# Patient Record
Sex: MALE
[De-identification: names, ages, dates, MRNs, and addresses within clinical notes are randomized per-mention and may not be internally consistent; named-entity substitution may affect disease eponyms.]

---

## 2018-09-21 ENCOUNTER — HOSPITAL ENCOUNTER (EMERGENCY)
Dept: HOSPITAL 39 - ER | Age: 33
Discharge: HOME | End: 2018-09-21
Payer: COMMERCIAL

## 2018-09-21 VITALS — DIASTOLIC BLOOD PRESSURE: 105 MMHG | SYSTOLIC BLOOD PRESSURE: 153 MMHG

## 2018-09-21 VITALS — OXYGEN SATURATION: 99 % | TEMPERATURE: 98.4 F

## 2018-09-21 DIAGNOSIS — S60.022A: ICD-10-CM
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
## 2018-09-21 DIAGNOSIS — S67.191A: Primary | ICD-10-CM
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
## 2018-09-21 PROCEDURE — 73140 X-RAY EXAM OF FINGER(S): CPT

## 2018-09-21 PROCEDURE — 80307 DRUG TEST PRSMV CHEM ANLYZR: CPT

## 2018-09-21 NOTE — ED.PDOC
History of Present Illness





- General


Chief Complaint: Upper Extremity Injury


Stated Complaint: smashed left index finger


Time Seen by Provider: 09/21/18 10:24


Source: patient


Exam Limitations: no limitations


Additional Information: 





33 YEAR OLD HERE AFTER A CRUSH INJURY TO THE LEFT INDEX FINGER AT WORK WHEN THE 

FINGER WAS CRUSHED UNDER A HYDRAULIC MACHINE








- History of Present Illness


Timing/Duration: 1/2 hour


Severity: moderate


Improving Factors: nothing


Associated Symptoms: syncope


Allergies/Adverse Reactions: 


Allergies





NO KNOWN ALLERGY Allergy (Unverified 06/02/15 14:54)


 








Home Medications: 


Ambulatory Orders





Naproxen [Naprosyn] 500 mg PO Q12HR #30 tab 09/21/18 











Review of Systems





- Review of Systems


Constitutional: States: no symptoms reported


EENTM: States: no symptoms reported


Respiratory: States: no symptoms reported


Cardiology: States: no symptoms reported


Gastrointestinal/Abdominal: States: no symptoms reported


Genitourinary: States: no symptoms reported


Musculoskeletal: States: no symptoms reported, see HPI


Skin: States: no symptoms reported


Neurological: States: no symptoms reported


Endocrine: States: no symptoms reported


Hematologic/Lymphatic: States: no symptoms reported


All other Systems: Reviewed and Negative





Past Medical History (General)





- Patient Medical History


Hx Seizures: No


Hx Stroke: No


Hx Dementia: No


Hx Asthma: No


Hx of COPD: No


Hx Cardiac Disorders: No


Hx Congestive Heart Failure: No


Hx Pacemaker: No


Hx Hypertension: No


Hx Thyroid Disease: No


Hx Diabetes: No


Hx Gastroesophageal Reflux: No


Hx Renal Disease: No


Hx Cancer: No


Hx of HIV: No


Hx Hepatitis C: No


Hx MRSA: No


Surgical History: no surgical history





- Vaccination History


Hx Tetanus, Diphtheria Vaccination: No


Hx Influenza Vaccination: No





- Social History


Hx Tobacco Use: No





Family Medical History





- Family History


  ** Mother


Family History: Unknown


Living Status: Still Living


Hx Family Diabetes: Yes





Departure





- Departure


Clinical Impression: 


 Crush accident, Contusion





Time of Disposition: 10:47


Disposition: Discharge to Home or Self Care


Condition: Good


Departure Forms:  ED Discharge - Pt. Copy, Patient Portal Self Enrollment


Instructions:  DI for Arm Pain


Diet: resume usual diet


Prescriptions: 


Naproxen [Naprosyn] 500 mg PO Q12HR #30 tab


Home Medications: 


Ambulatory Orders





Naproxen [Naprosyn] 500 mg PO Q12HR #30 tab 09/21/18

## 2018-09-21 NOTE — RAD
EXAM DESCRIPTION:

Fingers,Left



CLINICAL HISTORY:

33 years Male, TRAUMA



COMPARISON:

None.



FINDINGS:

Three views of the left index finger show no acute fracture or

malalignment. Diffuse soft tissue swelling is noted. The joint

spaces are well-maintained. Multiple tiny radiopaque structures

project on in over the left index finger, possibly representing

artifact from superimposed bandage material. No soft tissue gas

is identified.



IMPRESSION:

Diffuse soft tissue swelling without acute fracture or

malalignment.



Multiple tiny foreign bodies projecting on and over the left

index finger, possibly representing artifact from superimposed

bandage material. Debris or other tiny foreign bodies should also

be considered.



Electronically signed by:  Juanpablo Brown MD  9/21/2018 10:39 AM CDT

Workstation: [id number]